# Patient Record
Sex: MALE
[De-identification: names, ages, dates, MRNs, and addresses within clinical notes are randomized per-mention and may not be internally consistent; named-entity substitution may affect disease eponyms.]

---
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
## 2024-01-05 ENCOUNTER — RX ONLY (OUTPATIENT)
Age: 19
Setting detail: RX ONLY
End: 2024-01-05

## 2024-01-05 ENCOUNTER — APPOINTMENT (RX ONLY)
Dept: URBAN - METROPOLITAN AREA CLINIC 79 | Facility: CLINIC | Age: 19
Setting detail: DERMATOLOGY
End: 2024-01-05

## 2024-01-05 DIAGNOSIS — L70.0 ACNE VULGARIS: ICD-10-CM

## 2024-01-05 PROCEDURE — ? COUNSELING

## 2024-01-05 PROCEDURE — ? DEFER

## 2024-01-05 PROCEDURE — 99213 OFFICE O/P EST LOW 20 MIN: CPT

## 2024-01-05 PROCEDURE — ? TREATMENT REGIMEN

## 2024-01-05 RX ORDER — CLINDAMYCIN PHOSPHATE AND BENZOYL PEROXIDE 1 %-5 %
KIT TOPICAL
Qty: 50 | Refills: 1 | Status: ERX

## 2024-01-05 RX ORDER — SODIUM SULFACETAMIDE 100 MG/G
LIQUID TOPICAL
Qty: 480 | Refills: 0 | Status: ERX

## 2024-01-05 RX ORDER — TRETIONIN 0.25 MG/G
CREAM TOPICAL
Qty: 45 | Refills: 0 | Status: ERX

## 2024-01-05 RX ORDER — DOXYCYCLINE 100 MG/1
CAPSULE ORAL BID
Qty: 60 | Refills: 0 | Status: ERX

## 2024-02-02 ENCOUNTER — APPOINTMENT (RX ONLY)
Dept: URBAN - METROPOLITAN AREA CLINIC 79 | Facility: CLINIC | Age: 19
Setting detail: DERMATOLOGY
End: 2024-02-02

## 2024-02-02 DIAGNOSIS — L70.0 ACNE VULGARIS: ICD-10-CM

## 2024-02-02 DIAGNOSIS — D22 MELANOCYTIC NEVI: ICD-10-CM

## 2024-02-02 DIAGNOSIS — L85.3 XEROSIS CUTIS: ICD-10-CM

## 2024-02-02 PROBLEM — D22.9 MELANOCYTIC NEVI, UNSPECIFIED: Status: ACTIVE | Noted: 2024-02-02

## 2024-02-02 PROCEDURE — 99213 OFFICE O/P EST LOW 20 MIN: CPT | Mod: 25

## 2024-02-02 PROCEDURE — 11900 INJECT SKIN LESIONS </W 7: CPT

## 2024-02-02 PROCEDURE — ? INTRALESIONAL KENALOG

## 2024-02-02 PROCEDURE — ? DEFER

## 2024-02-02 PROCEDURE — ? TREATMENT REGIMEN

## 2024-02-02 PROCEDURE — ? COUNSELING

## 2024-02-02 ASSESSMENT — LOCATION SIMPLE DESCRIPTION DERM
LOCATION SIMPLE: LEFT UPPER BACK
LOCATION SIMPLE: LEFT SHOULDER

## 2024-02-02 ASSESSMENT — LOCATION DETAILED DESCRIPTION DERM
LOCATION DETAILED: LEFT POSTERIOR SHOULDER
LOCATION DETAILED: LEFT MID-UPPER BACK
LOCATION DETAILED: LEFT SUPERIOR UPPER BACK
LOCATION DETAILED: LEFT SUPERIOR LATERAL UPPER BACK

## 2024-02-02 ASSESSMENT — LOCATION ZONE DERM
LOCATION ZONE: TRUNK
LOCATION ZONE: ARM

## 2024-03-07 RX ADMIN — SULFACETAMIDE SODIUM: 100 LOTION TOPICAL at 00:00

## 2024-03-08 ENCOUNTER — APPOINTMENT (RX ONLY)
Dept: URBAN - METROPOLITAN AREA CLINIC 79 | Facility: CLINIC | Age: 19
Setting detail: DERMATOLOGY
End: 2024-03-08

## 2024-03-08 DIAGNOSIS — L70.0 ACNE VULGARIS: ICD-10-CM

## 2024-03-08 PROCEDURE — ? PRESCRIPTION

## 2024-03-08 PROCEDURE — ? COUNSELING

## 2024-03-08 PROCEDURE — 99213 OFFICE O/P EST LOW 20 MIN: CPT

## 2024-03-08 PROCEDURE — ? TREATMENT REGIMEN

## 2024-03-08 RX ORDER — TRETIONIN 0.25 MG/G
CREAM TOPICAL
Qty: 45 | Refills: 0 | Status: ERX

## 2024-03-08 RX ORDER — SULFACETAMIDE SODIUM 100 MG/ML
LOTION TOPICAL
Qty: 118 | Refills: 0 | Status: ERX | COMMUNITY
Start: 2024-03-07

## 2024-03-08 RX ORDER — CLINDAMYCIN PHOSPHATE AND BENZOYL PEROXIDE 1 %-5 %
KIT TOPICAL
Qty: 50 | Refills: 0 | Status: ERX